# Patient Record
Sex: MALE | Race: WHITE | Employment: FULL TIME | URBAN - METROPOLITAN AREA
[De-identification: names, ages, dates, MRNs, and addresses within clinical notes are randomized per-mention and may not be internally consistent; named-entity substitution may affect disease eponyms.]

---

## 2018-04-30 ENCOUNTER — HOSPITAL ENCOUNTER (OUTPATIENT)
Dept: OCCUPATIONAL THERAPY | Age: 58
Discharge: HOME | End: 2018-04-30
Admitting: ORTHOPAEDIC SURGERY

## 2018-04-30 ENCOUNTER — OFFICE VISIT (OUTPATIENT)
Dept: ORTHOPEDIC SURGERY | Age: 58
End: 2018-04-30

## 2018-04-30 VITALS
SYSTOLIC BLOOD PRESSURE: 122 MMHG | BODY MASS INDEX: 21.19 KG/M2 | WEIGHT: 135 LBS | HEIGHT: 67 IN | HEART RATE: 66 BPM | DIASTOLIC BLOOD PRESSURE: 71 MMHG

## 2018-04-30 DIAGNOSIS — M79.645 FINGER PAIN, LEFT: Primary | ICD-10-CM

## 2018-04-30 DIAGNOSIS — S62.625B OPEN DISPLACED FRACTURE OF MIDDLE PHALANX OF LEFT RING FINGER, INITIAL ENCOUNTER: ICD-10-CM

## 2018-04-30 PROCEDURE — 99024 POSTOP FOLLOW-UP VISIT: CPT | Performed by: ORTHOPAEDIC SURGERY

## 2018-05-01 ENCOUNTER — HOSPITAL ENCOUNTER (OUTPATIENT)
Dept: OCCUPATIONAL THERAPY | Age: 58
Discharge: HOME | End: 2018-05-31
Attending: ORTHOPAEDIC SURGERY | Admitting: ORTHOPAEDIC SURGERY

## 2018-05-02 ENCOUNTER — TELEPHONE (OUTPATIENT)
Dept: ORTHOPEDIC SURGERY | Age: 58
End: 2018-05-02

## 2018-05-02 DIAGNOSIS — S62.625B DISPLACED FRACTURE OF MIDDLE PHALANX OF LEFT RING FINGER, INITIAL ENCOUNTER FOR OPEN FRACTURE: Primary | ICD-10-CM

## 2018-05-02 DIAGNOSIS — S62.625D DISPLACED FRACTURE OF MIDDLE PHALANX OF LEFT RING FINGER, SUBSEQUENT ENCOUNTER FOR FRACTURE WITH ROUTINE HEALING: Primary | ICD-10-CM

## 2018-05-02 RX ORDER — HYDROCODONE BITARTRATE AND ACETAMINOPHEN 5; 325 MG/1; MG/1
1 TABLET ORAL EVERY 8 HOURS PRN
Qty: 12 TABLET | Refills: 0 | Status: SHIPPED | OUTPATIENT
Start: 2018-05-02 | End: 2018-05-05

## 2018-05-02 RX ORDER — HYDROCODONE BITARTRATE AND ACETAMINOPHEN 5; 325 MG/1; MG/1
1 TABLET ORAL EVERY 8 HOURS PRN
Qty: 12 TABLET | Refills: 0 | Status: SHIPPED | OUTPATIENT
Start: 2018-05-02 | End: 2018-05-02 | Stop reason: CLARIF

## 2018-05-24 ENCOUNTER — OFFICE VISIT (OUTPATIENT)
Dept: ORTHOPEDIC SURGERY | Age: 58
End: 2018-05-24

## 2018-05-24 VITALS — BODY MASS INDEX: 21.18 KG/M2 | HEIGHT: 67 IN | WEIGHT: 134.92 LBS

## 2018-05-24 DIAGNOSIS — S62.625B OPEN DISPLACED FRACTURE OF MIDDLE PHALANX OF LEFT RING FINGER, INITIAL ENCOUNTER: Primary | ICD-10-CM

## 2018-05-24 PROCEDURE — 14040 TIS TRNFR F/C/C/M/N/A/G/H/F: CPT | Performed by: ORTHOPAEDIC SURGERY

## 2018-05-24 PROCEDURE — 99024 POSTOP FOLLOW-UP VISIT: CPT | Performed by: ORTHOPAEDIC SURGERY

## 2018-05-24 RX ORDER — CEPHALEXIN 500 MG/1
500 CAPSULE ORAL 4 TIMES DAILY
Qty: 28 CAPSULE | Refills: 0 | Status: SHIPPED | OUTPATIENT
Start: 2018-05-24 | End: 2018-10-10 | Stop reason: ALTCHOICE

## 2018-05-31 ENCOUNTER — OFFICE VISIT (OUTPATIENT)
Dept: ORTHOPEDIC SURGERY | Age: 58
End: 2018-05-31

## 2018-05-31 VITALS — HEIGHT: 67 IN | BODY MASS INDEX: 21.18 KG/M2 | WEIGHT: 134.92 LBS

## 2018-05-31 DIAGNOSIS — S62.625B OPEN DISPLACED FRACTURE OF MIDDLE PHALANX OF LEFT RING FINGER, INITIAL ENCOUNTER: Primary | ICD-10-CM

## 2018-05-31 PROCEDURE — 99024 POSTOP FOLLOW-UP VISIT: CPT | Performed by: ORTHOPAEDIC SURGERY

## 2018-06-06 ENCOUNTER — OFFICE VISIT (OUTPATIENT)
Dept: ORTHOPEDIC SURGERY | Age: 58
End: 2018-06-06

## 2018-06-06 DIAGNOSIS — S62.625B OPEN DISPLACED FRACTURE OF MIDDLE PHALANX OF LEFT RING FINGER, INITIAL ENCOUNTER: Primary | ICD-10-CM

## 2018-06-06 PROCEDURE — 99024 POSTOP FOLLOW-UP VISIT: CPT | Performed by: ORTHOPAEDIC SURGERY

## 2018-06-12 ENCOUNTER — HOSPITAL ENCOUNTER (OUTPATIENT)
Dept: OCCUPATIONAL THERAPY | Age: 58
Discharge: HOME | End: 2018-06-30
Admitting: ORTHOPAEDIC SURGERY

## 2018-06-12 NOTE — FLOWSHEET NOTE
modulating pain, promoting relaxation,  increasing ROM, reducing/eliminating soft tissue swelling/inflammation/restriction, improving soft tissue extensibility and allowing for proper ROM for normal function with self care, reaching, carrying, lifting, house/yardwork, driving/computer work  [] Comments:    ADL Training:  []  (45649) Provided self-care/home management training related to activities of daily living and compensatory training, and/or use of adaptive equipment   [] Comments:     Splinting:  [] Fabrication of:    [x] (81887) Checkout for orthotic/prosthetic use, established patient-Tip protector splint, previous splint was too big to allow him to work    [] (68147) Orthotic management and training (fitting and assessment)  [] Comments:    Charges:  Timed Code Treatment Minutes: 15   Total Treatment Minutes: 65     [x] EVAL (LOW) 57527   [] OT Re-eval (20098)  [] EVAL (MOD) 42607   [] EVAL (HIGH) 92713       [x] Dread (37418) x      [] HNVVE(63847)  [] NMR (97152) x      [] Estim (attended) (39919)   [] Manual ([id number]) x       [] US (83760)  [] TA (53546) x      [] Paraffin (36102)  [] ADL  ([phone]) x     [] Splint/L code:    [x] Estim (unattended) (83477)  [x] [id number]    Frequency/Duration:  1-2 days per week for 6 weeks (7/24/18)        GOALS:  Short Term Goals: To be achieved in: 2 weeks  1. Independent in HEP and progression per patient tolerance, in order to prevent re-injury. 2. Patient will have a decrease in pain to facilitate improvement in movement, function, and ADLs as indicated by Functional Deficits.     Long Term Goals to be achieved in 6  weeks, including patient directed goals to address identified performance deficits:  1) Pt to be independent in graded HEP progression with a good level of effort and compliance. 2) Pt to report a score of 20 % or less on the Quick DASH disability questionnaire for increased performance with carrying, moving, and handling objects.   3) Pt will

## 2018-06-12 NOTE — PLAN OF CARE
II     Lateral Pinch     3 Point Pinch     Tip Pinch     MMT:            Observations (including splints, bandages, incisions, scars):   Dorsal finger completely healed except for 1 cm    Sensation: [] No reported deficits  [] Intact to light touch    [] Wiseman Charu test completed, findings as noted:  [x] Other:tingling volar middle and distal finger    Palpation:        Functional Mobility/Transfers/Gait: [x] Independent - no significant gait deviations  [] Assistance needed   [] Assistive device used: Falls Risk Assessment (30 days):   [] Falls Risk assessed and no intervention required. [] Falls Risk assessed and Patient requires intervention due to being higher risk   TUG score (>12s at risk):     [] Falls education provided, including      Review Of Systems (ROS): [x]Performed Review of systems (Integumentary, CardioPulmonary, Neurological) by intake and observation. Intake form has been scanned into medical record. Patient has been instructed to contact their primary care physician regarding ROS issues if not already being addressed at this time. ASSESSMENT:   This patient presents with signs and symptoms consistent with the medical diagnosis provided by the referring physician.      Impairments (physical, cognitive and/or psychosocial):  [x] Decreased mobility   [x] Weakness    [] Hypersensitivity   [x] Pain/tenderness   [x] Edema/swelling   [] Decreased coordination (fine/gross motor)   [] Impaired body mechanics  [] Sensory loss  [] Loss of balance   [] Other:      Performance Deficits (to be addressed in plan of care):   [] Bathing    [] Household Tasks (cooking/cleaning)   [] Dressing    [] Self Feeding   [] Grooming    [] Work/Education   [] Functional Mobility   [] Sleeping/Rest   [] Toileting/Hygiene   [] Recreational Activities   [] Driving    [] Community/Social Participation   [] Other:     Rehab Potential:   [] Excellent [] Good [] Fair  [] Poor     Barriers affecting rehab expanded review of medical and/or therapy records and additional review     of physical, cognitive or psychosocial history related to current functional    performance   [] an extensive additional review of review of medical and/or therapy records   and physical, cognitive, or psychosocial history related to current    functional performance    [] An assessment that identifies performance deficits (relating to physical, cognitive, or psychosocial skills) that result in activity limitations and/or participation restrictions:   [x] 1-3 performance deficits   [] 3-5 performance deficits   [] 5 or more performance deficits    [] Clinical decision making of:   [x] low complexity, including analysis of occupational profile, data analysis from problem focused assessment, and consideration of a limited number of treatment options. No comorbidities affect occupational performance. No task modifications or assistance needed to complete evaluation. [] moderate complexity, including analysis of occupational profile, data analysis from detailed assessment and consideration of several treatment options. Comorbidities that affect occupational performance may be present. Minimal to moderate task modifications or assistance needed to complete assessment. [] high complexity, including analysis of occupational profile, analysis of data from comprehensive assessment and consideration of multiple treatment options. Multiple comorbidities present that affect occupational performance. Significant task modifications or assistance needed to complete assessment. Evaluation Code:  [x] Low Complexity EVAL 52343 (typically 30 minutes face to face)  [] Mod Complexity EVAL 72967 (typically 45 minutes face to face)  [] High Complexity EVAL 10658 (typically 60 minutes face to face)             Electronically signed by:   Danielle Castillo OTR/L [address] [place] [address] [id number]

## 2018-06-19 ENCOUNTER — HOSPITAL ENCOUNTER (OUTPATIENT)
Dept: OCCUPATIONAL THERAPY | Age: 58
Discharge: HOME | End: 2018-06-20
Admitting: ORTHOPAEDIC SURGERY

## 2018-06-27 ENCOUNTER — OFFICE VISIT (OUTPATIENT)
Dept: ORTHOPEDIC SURGERY | Age: 58
End: 2018-06-27

## 2018-06-27 DIAGNOSIS — S62.625B OPEN DISPLACED FRACTURE OF MIDDLE PHALANX OF LEFT RING FINGER, INITIAL ENCOUNTER: Primary | ICD-10-CM

## 2018-06-27 PROCEDURE — 99024 POSTOP FOLLOW-UP VISIT: CPT | Performed by: ORTHOPAEDIC SURGERY

## 2018-07-01 ENCOUNTER — HOSPITAL ENCOUNTER (OUTPATIENT)
Dept: PHYSICAL THERAPY | Age: 58
Discharge: HOME | End: 2018-07-31
Attending: ORTHOPAEDIC SURGERY | Admitting: ORTHOPAEDIC SURGERY

## 2018-08-01 ENCOUNTER — OFFICE VISIT (OUTPATIENT)
Dept: ORTHOPEDIC SURGERY | Age: 58
End: 2018-08-01

## 2018-08-01 VITALS — BODY MASS INDEX: 21.03 KG/M2 | HEIGHT: 67 IN | WEIGHT: 134 LBS

## 2018-08-01 DIAGNOSIS — S62.625B OPEN DISPLACED FRACTURE OF MIDDLE PHALANX OF LEFT RING FINGER, INITIAL ENCOUNTER: Primary | ICD-10-CM

## 2018-08-01 PROCEDURE — 99024 POSTOP FOLLOW-UP VISIT: CPT | Performed by: ORTHOPAEDIC SURGERY

## 2018-08-03 NOTE — PROGRESS NOTES
will continue to discuss options after most recent studies have been completed    No Follow-up on file. History of Present Illness  Mr. Tyrel Brunner a 57-year-old male presenting as a repeat scheduled postoperative visit after his left ring finger surgery  Procedure:1.  Irrigation and debridement of open fracture including skin,  subcutaneous tissue, muscle fascia, and bone of left ring finger middle  phalanx. 2.  Open reduction and internal fixation of left ring finger middle phalanx  fracture. 3.  Repair of finger extensor tendon left ring finger. 4.  Repair of complex laceration left ring finger 3.5 cm. Date of procedure: 4/24/2018  Is now approximately 13 weeks status post surgery. Since his last visit, he has not had many changes with regards to his left ring finger. He continues to have some intermittent pain and this does become uncomfortable while he was attempting to work. He does continue to note some instability in his fingertip and has been attempting some bracing but this is been difficult for him. No new swelling or drainage although he did notice a small suture from a pinhole area of wound that had been incompletely healed. He did remove some of the suture. No fever or chills or no other constitutional symptoms today    Review of Systems  Pertinent items are noted in HPI  Review of systems reviewed from Patient History Form dated on 4/24/18 and available in the patient's chart under the Media tab. Vital Signs  There were no vitals filed for this visit. Physical Exam  Constitutional:  Patient is well-nourished and demonstrates normal hygiene. Mental Status:  Patient is alert and oriented to person, place and time. Skin:  Intact, no rashes or lesions. Left ring finger/hand:   Near complete healed wound with pinpoint area at most distal apex of laceration nonhealed, no active drainage no surrounding erythema or fluctuance.  There is a deep suture protruding from this area which was clipped with no obvious exposed tendon or bone  Diffuse swelling and fibrinous scar at the dorsum of middle phalanx extending to near PIP joint , slightly diminished from previous examination with no fluctuance  With gentle stress of fracture there is laxity at fracture site with mild discomfort  Normal finger cascade and tenodesis with no active DIP joint motion  PIP joint range of motion approximately 0 to 90 ° without increased pain  No pain in palm, hand or remainder of finger  Gross sensation intact to fingertip with capillary refill brisk at fingertip    Capillary refill brisk all fingers, symmetric  Gross sensation intact to light touch median/ulnar/radial nerves  Sensation intact to radial/ulnar aspect of fingertip        Radiology:    X-rays obtained and reviewed in office:  Views 3  Location left ring finger  Impression unchanged position of middle phalangeal neck fracture. There is increased density near this area with fracture line still evident.  No change otherwise in position and no additional osseous and normality      Additional Diagnostic Test Findings:    Office Procedures:  Orders Placed This Encounter   Procedures    XR FINGER LEFT (MIN 2 VIEWS)     10082     Order Specific Question:   Reason for exam:     Answer:   Pain    MRI HAND LEFT W WO CONTRAST     Osteomyelitis of left ring finger  Once approved will schedule at [facility]     Standing Status:   Future     Standing Expiration Date:   8/1/2019     Order Specific Question:   Reason for exam:     Answer:   PAIN    C-reactive protein    VITAMIN D 1,25 DIHYDROXY     Standing Status:   Future     Standing Expiration Date:   8/2/2019    Testosterone     Standing Status:   Future     Standing Expiration Date:   8/2/2019    TSH without Reflex     Standing Status:   Future     Standing Expiration Date:   8/2/2019    T4, FREE     Standing Status:   Future     Standing Expiration Date:   8/2/2019    PREALBUMIN     Standing Status:

## 2018-08-07 ENCOUNTER — HOSPITAL ENCOUNTER (OUTPATIENT)
Dept: MRI IMAGING | Age: 58
Discharge: HOME | End: 2018-08-07
Attending: ORTHOPAEDIC SURGERY | Admitting: ORTHOPAEDIC SURGERY

## 2018-08-07 DIAGNOSIS — S62.625B OPEN DISPLACED FRACTURE OF MIDDLE PHALANX OF LEFT RING FINGER, INITIAL ENCOUNTER: ICD-10-CM

## 2018-08-07 DIAGNOSIS — S62.625B: ICD-10-CM

## 2018-08-08 ENCOUNTER — HOSPITAL ENCOUNTER (OUTPATIENT)
Dept: OTHER | Age: 58
Discharge: HOME | End: 2018-08-08
Attending: ORTHOPAEDIC SURGERY | Admitting: ORTHOPAEDIC SURGERY

## 2018-08-08 DIAGNOSIS — S62.625B OPEN DISPLACED FRACTURE OF MIDDLE PHALANX OF LEFT RING FINGER, INITIAL ENCOUNTER: ICD-10-CM

## 2018-08-08 LAB
CALCIUM SERPL-MCNC: 9.7 MG/DL (ref 8.3–10.6)
PREALBUMIN: 25.8 MG/DL (ref 20–40)
T4 FREE: 1 NG/DL (ref 0.9–1.8)
TSH SERPL DL<=0.05 MIU/L-ACNC: 5.09 UIU/ML (ref 0.27–4.2)

## 2018-08-09 LAB — C-REACTIVE PROTEIN: 0.8 MG/L (ref 0–5.1)

## 2018-08-10 LAB — TESTOSTERONE TOTAL: 667 NG/DL (ref 220–1000)

## 2018-08-11 LAB — VITAMIN D 1,25-DIHYDROXY: 45.5 PG/ML (ref 19.9–79.3)

## 2018-08-22 LAB
3-OH-COTININE: 117 NG/ML
COTININE: 226 NG/ML
NICOTINE: 10 NG/ML

## 2018-08-23 ENCOUNTER — OFFICE VISIT (OUTPATIENT)
Dept: ORTHOPEDIC SURGERY | Age: 58
End: 2018-08-23

## 2018-08-23 DIAGNOSIS — S62.625B OPEN DISPLACED FRACTURE OF MIDDLE PHALANX OF LEFT RING FINGER, INITIAL ENCOUNTER: Primary | ICD-10-CM

## 2018-08-23 PROCEDURE — 99213 OFFICE O/P EST LOW 20 MIN: CPT | Performed by: ORTHOPAEDIC SURGERY

## 2018-08-24 NOTE — PROGRESS NOTES
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 Although I think that infection could be a possibility, I am less suspicious based on his current clinical exam and labs in advanced imaging that this is the cause of his nonunion. Rather I think that this is a combination of lack of mechanical stability as well as his history of smoking which have contributed to his current nonunion and subsequent instability and symptoms. He does have a severe injury and right now he is most bothered by both the stiffness in his PIP joint but also his left ring finger getting in the way of gripping and performing his daily activities. We discussed options today for treatment including the possibility of continued therapy for PIP joint range of motion understanding that stiffness will likely be a part of his recovery regardless of treatment. Surgical options were also discussed including the possibility of arthrodesis at the IP joint and bone grafting. Finally, we did discuss the possibility of amputation near the level of his fracture as an option to allow for earlier healing and possibly improve his function. The patient desires amputation at this point, understanding his other surgical and nonsurgical options that we discussed. We had a long discussion and for further evaluation I would like to refer him to my hand surgery colleagues Dr. Kim Colin for an opinion, any other suggestions that we may consider in this case and if amputation is a viable option for the patient. The patient is in agreement with this today and we'll continue with his activity as tolerated    He will currently be on one-handed duty secondary to his failure to demonstrate appropriate healing, continued pain and instability in his finger.  We will continue laxity at fracture site with mild discomfort  Normal finger cascade and tenodesis with no active DIP joint motion  PIP joint range of motion approximately 0 to 80° with no increased pain  There is slight malrotation of the left ring finger throughout finger cascade  No pain in palm, hand or remainder of finger  Gross sensation intact to fingertip with capillary refill brisk at fingertip  Capillary refill brisk all fingers, symmetric  Gross sensation intact to light touch median/ulnar/radial nerves  Sensation intact to radial/ulnar aspect of fingertip        Radiology:    X-rays obtained and reviewed in office:  No additional images obtained today     Additional Diagnostic Test Findings:  EXAMINATION:   MRI OF THE LEFT HAND WITHOUT AND WITH CONTRAST, 8/7/2018 7:49 am       TECHNIQUE:   Multiplanar multisequence MRI of the left hand was performed without and with   the administration of intravenous contrast.       COMPARISON:   08/01/2018, 01/06/2018 plain radiographs of the left ring finger       HISTORY:   ORDERING PHYSICIAN PROVIDED HISTORY: Open displaced fracture of middle   phalanx of left ring finger, initial encounter   TECHNOLOGIST PROVIDED HISTORY:   Osteomyelitis of left ring finger   Once approved will schedule at [place] Provided Reason for Exam: ATTENTION TO LEFT 4TH PHALANGE OPEN   DISPLACED FX THAT IS NOT HEALING   Acuity: Acute   Type of Encounter: Initial   Additional signs and symptoms: ATTENTION TO LEFT 4TH PHALANGE OPEN DISPLACED   FX THAT IS NOT HEALING   Relevant Medical/Surgical History: ATTENTION TO LEFT 4TH PHALANGE OPEN   DISPLACED FX THAT IS NOT HEALING       Patient is a 61-year-old male with nonhealing open fracture of the middle   phalanx of the left ring finger; evaluate for osteomyelitis.       FINDINGS:   SOFT TISSUES: Minimal edema within the visualized subcutaneous fat of the   left hand without clear evidence for abscess formation or definite   cellulitis.  Visualized musculature grossly unremarkable.  Visualized flexor   and extensor tendons appear grossly intact without evidence of tearing or   tenosynovitis.  Visualized collateral ligaments appear intact.       Soft tissue marker is seen dorsal to the proximal 4th and 5th metacarpals   without any subjacent fluid collection, soft tissue mass or signal   abnormality.       BONE MARROW: There are vertically oriented linear areas of low T1 signal and   high STIR signal within the proximal and distal phalanges of the 4th   digit/ring finger likely representing the tracts related to prior hardware   placement/orthopedic pinning.  No obvious osseous destruction is evident.  No   sizable DIP joint effusion is seen at the DIP joint of the 4th digit.  These   findings are well visualized on coronal STIR image number 17, series 5 and   coronal T1 weighted image number 17, series 4 as well as sagittal STIR images   11-14, series 6.  No avid postcontrast enhancement is identified within this   location of postcontrast imaging.       No marginal erosions.  Bone marrow signal intensity within the visualized   osseous structures is otherwise within normal limits.       Subcortical cystic change is seen within the capitate on image 19, series 4   and series 5.       JOINTS: Mild degenerative changes of the visualized intercarpal joint spaces. Mild degenerative changes at the 1st MCP joint and 1st carpometacarpal joint   as well as the scaphoid-trapezium articulation.           Impression   1. Findings involving the middle and distal phalanges of the 4th digit/ring   finger are favored to be changes related to prior surgical pinning of the   middle and distal phalanges of the 4th digit.  While subtle infection   difficult to exclude due to small field of view, no overt or clear MR   evidence for osteomyelitis or aggressive osseous destruction at this point. 2. Mild underlying osteoarthrosis.    3. No abscess formation or obvious

## 2018-09-04 ENCOUNTER — TELEPHONE (OUTPATIENT)
Dept: ORTHOPEDIC SURGERY | Age: 58
End: 2018-09-04

## 2018-09-04 NOTE — TELEPHONE ENCOUNTER
SPOKE WTIH PATIENT . Sunita Guillen STATES HIS WORKERS COMP CASE IS BEING CLOSED SINCE HE'S \"FULL DUTY\" . 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 TOLD PATIENT I WILL DISCUSS THIS WITH DR. Maki Qiu  TO CONFIRM HIS RESTRICTIONS. ALSO EXPLAINED TO HIM HE WAS APPROVED TO HAVE THE 2ND OPINION DONE W/ DR. KATZ

## 2018-09-10 ENCOUNTER — OFFICE VISIT (OUTPATIENT)
Dept: ORTHOPEDIC SURGERY | Age: 58
End: 2018-09-10

## 2018-09-10 DIAGNOSIS — S66.325A LACERATION OF EXTENSOR MUSCLE, FASCIA AND TENDON OF LEFT RING FINGER AT WRIST AND HAND LEVEL, INITIAL ENCOUNTER: ICD-10-CM

## 2018-09-10 DIAGNOSIS — S62.625S OPEN DISPLACED FRACTURE OF MIDDLE PHALANX OF LEFT RING FINGER, SEQUELA: ICD-10-CM

## 2018-09-10 DIAGNOSIS — S67.195S: ICD-10-CM

## 2018-09-10 DIAGNOSIS — S61.215S LACERATION OF LEFT RING FINGER WITHOUT FOREIGN BODY WITHOUT DAMAGE TO NAIL, SEQUELA: Primary | ICD-10-CM

## 2018-09-10 PROCEDURE — 99243 OFF/OP CNSLTJ NEW/EST LOW 30: CPT | Performed by: ORTHOPAEDIC SURGERY

## 2018-09-10 NOTE — PROGRESS NOTES
Systems  Pertinent items are noted in HPI  Denies fever chills, confusion and bowel and bladder active change  Complete Review of Systems reviewed from patient history form dated 9/10/2018 and available in the patients chart under the media tab. Vital Signs  There were no vitals filed for this visit. There is no height or weight on file to calculate BMI. Medical History  History reviewed. No pertinent past medical history. Current Outpatient Prescriptions on File Prior to Visit   Medication Sig Dispense Refill    cephALEXin (KEFLEX) 500 MG capsule Take 1 capsule by mouth 4 times daily 28 capsule 0    docusate sodium (COLACE) 100 MG capsule Take 1 capsule by mouth 2 times daily as needed for Constipation 40 capsule 0    cephALEXin (KEFLEX) 500 MG capsule Take 1 capsule by mouth 4 times daily 20 capsule 0     No current facility-administered medications on file prior to visit. No Known Allergies  [unfilled]  History reviewed. No pertinent family history. Physical Exam  Constitutional:  Patient is well-nourished and demonstrates normal hygiene. Mental Status:  Patient is alert and oriented to person, place and time. Skin:  Intact, no rashes or lesions. Hand Examination: There is slight ulnar deviation deformity through the fracture site and there still clinical motion in the fracture site he has no flexion or extension beyond 0° ankylosis of the distal interphalangeal joint PIP range of motion is full extension to about 75 or 80° flexion    Additional Comments:     Additional Examinations:  X-Ray Findings:  I reviewed his series of radiographs and it does appear that he has a nonunion of the middle phalanx. There was good position of the fracture after the pinning.   Additional Diagnostic Test Findings: MRI study of the digit does not show avascular necrosis of the condyles of the middle phalanx    Office Procedures:    I spent 15 minutes, face to face, with the patient discussing treatment

## 2018-09-14 ENCOUNTER — TELEPHONE (OUTPATIENT)
Dept: ORTHOPEDIC SURGERY | Age: 58
End: 2018-09-14

## 2018-09-26 ENCOUNTER — OFFICE VISIT (OUTPATIENT)
Dept: ORTHOPEDIC SURGERY | Age: 58
End: 2018-09-26
Payer: COMMERCIAL

## 2018-09-26 DIAGNOSIS — S62.625B OPEN DISPLACED FRACTURE OF MIDDLE PHALANX OF LEFT RING FINGER, INITIAL ENCOUNTER: Primary | ICD-10-CM

## 2018-09-26 PROCEDURE — 99213 OFFICE O/P EST LOW 20 MIN: CPT | Performed by: ORTHOPAEDIC SURGERY

## 2018-09-26 NOTE — PROGRESS NOTES
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 We discussed options such as continued observation and working on range of motion. We also discussed possibility of repeat surgical intervention, attempted arthrodesis and repositioning of his DIP joint in fracture. Ultimately, the patient understands his options but feels that the best option for him would be amputation in order to allow for more straightforward healing, less variation in position of the finger and further stiffness  He understands the risks involved including infection, bleeding, damaged nerves and blood vessels, stiffness, continued pain in the possibility of neuroma formation  We also discussed risks of anesthesia including stroke heart attack blood clot and death. I ultimately think that this is a difficult situation and the patient does understand that amputation is not an ideal option but I do think that this can be considered in this case for the reasons the patient desires. We will plan for amputation near the level of middle phalanx to allow for preserved PIP joint function. Plan for surgery scheduling within the next several weeks, patient is understanding and in agreement with the plan today      No Follow-up on file. History of Present Illness  Patrick Grullon is a 56 y.o. male here today for a follow-up for His left ring finger. He had a severe crush injury to left ring finger with middle phalanx fracture with subsequent stiffness and delayed union/nonunion. At his last visit we discussed options and the patient did desire the option of amputation because of pain, stiffness and difficulty with functioning at work with his current finger.   I did have him follow-up with one of my colleagues for further discussed options for him and to discuss the possibility of amputation as well  He is here today to discuss and further plan for decision making. He has been working on range of motion at the PIP joint and overall minimal change since his last visit. He would like to strongly consider surgical intervention and particularly amputation at this point    Review of Systems  Pertinent items are noted in HPI  Review of systems reviewed from Patient History Form dated on 4/30/18 and available in the patient's chart under the Media tab. Vital Signs  There were no vitals filed for this visit. Physical Exam  Constitutional:  Patient is well-nourished and demonstrates normal hygiene. Mental Status:  Patient is alert and oriented to person, place and time. Skin:  Intact, no rashes or lesions. Left ring finger/hand:   Healed dorsal wound without any evidence of erythema or fluctuance and no drainage     Diffuse swelling and fibrinous scar at the dorsum of middle phalanx extending to near PIP joint , no obvious fluctuance  With gentle stress of fracture there is continued instability and mild discomfort fracture site  PIP joint range of motion approximately 0 to 80° with no increased pain  There is slight malrotation of the left ring finger throughout finger cascade  No pain in palm, hand or remainder of finger  Gross sensation intact to fingertip with capillary refill brisk at fingertip  Capillary refill brisk all fingers, symmetric  Gross sensation intact to light touch median/ulnar/radial nerves  Sensation intact to radial/ulnar aspect of fingertip          Radiology:    X-rays obtained and reviewed in office:  No new images obtained today    Additional Diagnostic Test Findings:    Office Procedures:  No orders of the defined types were placed in this encounter.           Linette Ramsey MD  Orthopaedic Surgeon, [address] [place] [address] Orthopaedic & Sports Medicine    Contact Information:  Xavier Kinney: [phone]

## 2018-10-03 NOTE — PROGRESS NOTES
[facility] / [facility] ([facility]) [address] [facility], [address]    Acknowledgment of Informed Consent for Surgical or Medical Procedure and Sedation  I agree to allow doctor(s) [facility] and his/her associates or assistants, including residents and/or other qualified medical practitioner to perform the following medical treatment or procedure and to administer or direct the administration of sedation as necessary:  Procedure(s): LEFT RING FINGER REVISION AMPUTATION  [address]  My doctor has explained the following regarding the proposed procedure:   the explanation of the procedure   the benefits of the procedure   the potential problems that might occur during recuperation   the risks and side effects of the procedure which could include but are not limited to severe blood loss, infection, stroke or death   the benefits, risks and side effect of alternative procedures including the consequences of declining this procedure or any alternative procedures   the likelihood of achieving satisfactory results. I acknowledge no guarantee or assurance has been made to me regarding the results. I understand that during the course of this treatment/procedure, unforeseen conditions can occur which require an additional or different procedure. I agree to allow my physician or assistants to perform such extension of the original procedure as they may find necessary. I understand that sedation will often result in temporary impairment of memory and fine motor skills and that sedation can occasionally progress to a state of deep sedation or general anesthesia. I understand the risks of anesthesia for surgery include, but are not limited to, sore throat, hoarseness, injury to face, mouth, or teeth; nausea; headache; injury to blood vessels or nerves; death, brain damage, or paralysis.     I understand that if I have a Limitation of Treatment order in effect during my

## 2018-10-10 NOTE — PROGRESS NOTES
items. Please have any large items you may need brought in by your family after your arrival to your hospital room. 15. If you have a Living Will or Durable Power of , please bring a copy on the day of your procedure. 15. With your permission, one family member may accompany you while you are being prepared for surgery. Once you are ready, additional family members may join you. HOW WE KEEP YOU SAFE and WORK TO PREVENT SURGICAL SITE INFECTIONS:  1. Health care workers should always check your ID bracelet to verify your name and birth date. You will be asked many times to state your name, date of birth, and allergies. 2. Health care workers should always clean their hands with soap or alcohol gel before providing care to you. It is okay to ask anyone if they cleaned their hands before they touch you. 3. You will be actively involved in verifying the type of procedure you are having and ensuring the correct surgical site. This will be confirmed multiple times prior to your procedure. Do NOT demetrius your surgery site UNLESS instructed to by your surgeon. 4. Do not shave or wax for 72 hours prior to procedure near your operative site. Shaving with a razor can irritate your skin and make it easier to develop an infection. On the day of your procedure, any hair that needs to be removed near the surgical site will be clipped by a healthcare worker using a special clippers designed to avoid skin irritation. 5. When you are in the operating room, your surgical site will be cleansed with a special soap, and in most cases, you will be given an antibiotic before the surgery begins. What to expect AFTER YOUR PROCEDURE:  1. Immediately following your procedure, your will be taken to the PACU for the first phase of your recovery. Your nurse will help you recover from any potential side effects of anesthesia, such as extreme drowsiness, changes in your vital signs or breathing patterns.  Nausea, headache, muscle aches, or sore throat may also occur after anesthesia. Your nurse will help you manage these potential side effects. 2. For comfort and safety, arrange to have someone at home with you for the first 24 hours after discharge. 3. You and your family will be given written instructions about your diet, activity, dressing care, medications, and return visits. 4. Once at home, should issues with nausea, pain, or bleeding occur, or should you notice any signs of infection, you should call your surgeon. 5. Always clean your hands before and after caring for your wound. Do not let your family touch your surgery site without cleaning their hands. 6. Narcotic pain medications can cause significant constipation. You may want to add a stool softener to your postoperative medication schedule or speak to your surgeon on how best to manage this SIDE EFFECT. SPECIAL INSTRUCTIONS     Thank you for allowing us to care for you. We strive to exceed your expectations in the delivery of care and service provided to you and your family. If you need to contact us for any reason, please call us at [phone]    Instructions reviewed with patient during preadmission testing phone interview. Nancy Tate. 10/10/2018 .12:43 PM      ADDITIONAL EDUCATIONAL INFORMATION REVIEWED PER PHONE WITH YOU AND/OR YOUR FAMILY:  No Bring a urine sample on day of surgery  Yes Pain Goal-Taking Control of Your Pain  Yes FAQs about Surgical Site Infections  No Hibiclens® Bathing Instructions   Yes Antibacterial Soap  No Pb® Wipes Bathing Instructions (Obtained from: https://www.[employer]/. pdf )  No Incentive Spirometer Education  No Other

## 2018-10-11 ENCOUNTER — ANESTHESIA EVENT (OUTPATIENT)
Dept: OPERATING ROOM | Age: 58
End: 2018-10-11
Payer: COMMERCIAL

## 2018-10-12 ENCOUNTER — ANESTHESIA (OUTPATIENT)
Dept: OPERATING ROOM | Age: 58
End: 2018-10-12
Payer: COMMERCIAL

## 2018-10-12 ENCOUNTER — HOSPITAL ENCOUNTER (OUTPATIENT)
Age: 58
Setting detail: OUTPATIENT SURGERY
Discharge: HOME | End: 2018-10-12
Attending: ORTHOPAEDIC SURGERY | Admitting: ORTHOPAEDIC SURGERY
Payer: COMMERCIAL

## 2018-10-12 VITALS
BODY MASS INDEX: 22.76 KG/M2 | RESPIRATION RATE: 18 BRPM | HEIGHT: 67 IN | DIASTOLIC BLOOD PRESSURE: 88 MMHG | WEIGHT: 145 LBS | SYSTOLIC BLOOD PRESSURE: 140 MMHG | OXYGEN SATURATION: 98 % | HEART RATE: 59 BPM | TEMPERATURE: 97.5 F

## 2018-10-12 VITALS
DIASTOLIC BLOOD PRESSURE: 57 MMHG | OXYGEN SATURATION: 98 % | RESPIRATION RATE: 13 BRPM | SYSTOLIC BLOOD PRESSURE: 99 MMHG | TEMPERATURE: 95.5 F

## 2018-10-12 DIAGNOSIS — S62.625B OPEN DISPLACED FRACTURE OF MIDDLE PHALANX OF LEFT RING FINGER, INITIAL ENCOUNTER: Primary | ICD-10-CM

## 2018-10-12 PROCEDURE — 87205 SMEAR GRAM STAIN: CPT

## 2018-10-12 PROCEDURE — 2709999900 HC NON-CHARGEABLE SUPPLY: Performed by: ORTHOPAEDIC SURGERY

## 2018-10-12 PROCEDURE — 2500000003 HC RX 250 WO HCPCS: Performed by: ORTHOPAEDIC SURGERY

## 2018-10-12 PROCEDURE — 87102 FUNGUS ISOLATION CULTURE: CPT

## 2018-10-12 PROCEDURE — 88311 DECALCIFY TISSUE: CPT

## 2018-10-12 PROCEDURE — 3700000000 HC ANESTHESIA ATTENDED CARE: Performed by: ORTHOPAEDIC SURGERY

## 2018-10-12 PROCEDURE — 2500000003 HC RX 250 WO HCPCS: Performed by: NURSE ANESTHETIST, CERTIFIED REGISTERED

## 2018-10-12 PROCEDURE — 6370000000 HC RX 637 (ALT 250 FOR IP): Performed by: ORTHOPAEDIC SURGERY

## 2018-10-12 PROCEDURE — 3600000014 HC SURGERY LEVEL 4 ADDTL 15MIN: Performed by: ORTHOPAEDIC SURGERY

## 2018-10-12 PROCEDURE — 2580000003 HC RX 258: Performed by: ANESTHESIOLOGY

## 2018-10-12 PROCEDURE — 87116 MYCOBACTERIA CULTURE: CPT

## 2018-10-12 PROCEDURE — 6360000002 HC RX W HCPCS: Performed by: ANESTHESIOLOGY

## 2018-10-12 PROCEDURE — 7100000011 HC PHASE II RECOVERY - ADDTL 15 MIN: Performed by: ORTHOPAEDIC SURGERY

## 2018-10-12 PROCEDURE — 7100000010 HC PHASE II RECOVERY - FIRST 15 MIN: Performed by: ORTHOPAEDIC SURGERY

## 2018-10-12 PROCEDURE — 87070 CULTURE OTHR SPECIMN AEROBIC: CPT

## 2018-10-12 PROCEDURE — 87015 SPECIMEN INFECT AGNT CONCNTJ: CPT

## 2018-10-12 PROCEDURE — 7100000000 HC PACU RECOVERY - FIRST 15 MIN: Performed by: ORTHOPAEDIC SURGERY

## 2018-10-12 PROCEDURE — 88305 TISSUE EXAM BY PATHOLOGIST: CPT

## 2018-10-12 PROCEDURE — 2580000003 HC RX 258: Performed by: ORTHOPAEDIC SURGERY

## 2018-10-12 PROCEDURE — 6360000002 HC RX W HCPCS: Performed by: NURSE ANESTHETIST, CERTIFIED REGISTERED

## 2018-10-12 PROCEDURE — 3700000001 HC ADD 15 MINUTES (ANESTHESIA): Performed by: ORTHOPAEDIC SURGERY

## 2018-10-12 PROCEDURE — 3600000004 HC SURGERY LEVEL 4 BASE: Performed by: ORTHOPAEDIC SURGERY

## 2018-10-12 PROCEDURE — 87206 SMEAR FLUORESCENT/ACID STAI: CPT

## 2018-10-12 PROCEDURE — 7100000001 HC PACU RECOVERY - ADDTL 15 MIN: Performed by: ORTHOPAEDIC SURGERY

## 2018-10-12 RX ORDER — MAGNESIUM HYDROXIDE 1200 MG/15ML
LIQUID ORAL CONTINUOUS PRN
Status: DISCONTINUED | OUTPATIENT
Start: 2018-10-12 | End: 2018-10-12 | Stop reason: HOSPADM

## 2018-10-12 RX ORDER — MIDAZOLAM HYDROCHLORIDE 1 MG/ML
INJECTION INTRAMUSCULAR; INTRAVENOUS PRN
Status: DISCONTINUED | OUTPATIENT
Start: 2018-10-12 | End: 2018-10-12 | Stop reason: SDUPTHER

## 2018-10-12 RX ORDER — SODIUM CHLORIDE, SODIUM LACTATE, POTASSIUM CHLORIDE, CALCIUM CHLORIDE 600; 310; 30; 20 MG/100ML; MG/100ML; MG/100ML; MG/100ML
INJECTION, SOLUTION INTRAVENOUS CONTINUOUS
Status: DISCONTINUED | OUTPATIENT
Start: 2018-10-12 | End: 2018-10-12 | Stop reason: HOSPADM

## 2018-10-12 RX ORDER — LIDOCAINE HYDROCHLORIDE 20 MG/ML
INJECTION, SOLUTION EPIDURAL; INFILTRATION; INTRACAUDAL; PERINEURAL PRN
Status: DISCONTINUED | OUTPATIENT
Start: 2018-10-12 | End: 2018-10-12 | Stop reason: SDUPTHER

## 2018-10-12 RX ORDER — PROMETHAZINE HYDROCHLORIDE 25 MG/ML
6.25 INJECTION, SOLUTION INTRAMUSCULAR; INTRAVENOUS
Status: DISCONTINUED | OUTPATIENT
Start: 2018-10-12 | End: 2018-10-12 | Stop reason: HOSPADM

## 2018-10-12 RX ORDER — FENTANYL CITRATE 50 UG/ML
25 INJECTION, SOLUTION INTRAMUSCULAR; INTRAVENOUS EVERY 5 MIN PRN
Status: DISCONTINUED | OUTPATIENT
Start: 2018-10-12 | End: 2018-10-12 | Stop reason: HOSPADM

## 2018-10-12 RX ORDER — PROPOFOL 10 MG/ML
INJECTION, EMULSION INTRAVENOUS PRN
Status: DISCONTINUED | OUTPATIENT
Start: 2018-10-12 | End: 2018-10-12 | Stop reason: SDUPTHER

## 2018-10-12 RX ORDER — FENTANYL CITRATE 50 UG/ML
50 INJECTION, SOLUTION INTRAMUSCULAR; INTRAVENOUS EVERY 5 MIN PRN
Status: DISCONTINUED | OUTPATIENT
Start: 2018-10-12 | End: 2018-10-12 | Stop reason: HOSPADM

## 2018-10-12 RX ORDER — ONDANSETRON 2 MG/ML
4 INJECTION INTRAMUSCULAR; INTRAVENOUS
Status: COMPLETED | OUTPATIENT
Start: 2018-10-12 | End: 2018-10-12

## 2018-10-12 RX ORDER — HYDROCODONE BITARTRATE AND ACETAMINOPHEN 5; 325 MG/1; MG/1
1 TABLET ORAL EVERY 6 HOURS PRN
Qty: 18 TABLET | Refills: 0 | Status: SHIPPED | OUTPATIENT
Start: 2018-10-12 | End: 2018-10-15

## 2018-10-12 RX ORDER — DEXAMETHASONE SODIUM PHOSPHATE 4 MG/ML
INJECTION, SOLUTION INTRA-ARTICULAR; INTRALESIONAL; INTRAMUSCULAR; INTRAVENOUS; SOFT TISSUE PRN
Status: DISCONTINUED | OUTPATIENT
Start: 2018-10-12 | End: 2018-10-12 | Stop reason: SDUPTHER

## 2018-10-12 RX ORDER — DOCUSATE SODIUM 100 MG/1
100 CAPSULE, LIQUID FILLED ORAL 2 TIMES DAILY PRN
Qty: 40 CAPSULE | Refills: 0 | Status: SHIPPED | OUTPATIENT
Start: 2018-10-12 | End: 2019-01-09

## 2018-10-12 RX ORDER — GINSENG 100 MG
CAPSULE ORAL PRN
Status: DISCONTINUED | OUTPATIENT
Start: 2018-10-12 | End: 2018-10-12 | Stop reason: HOSPADM

## 2018-10-12 RX ORDER — FENTANYL CITRATE 50 UG/ML
INJECTION, SOLUTION INTRAMUSCULAR; INTRAVENOUS PRN
Status: DISCONTINUED | OUTPATIENT
Start: 2018-10-12 | End: 2018-10-12 | Stop reason: SDUPTHER

## 2018-10-12 RX ADMIN — PROPOFOL 250 MG: 10 INJECTION, EMULSION INTRAVENOUS at 07:36

## 2018-10-12 RX ADMIN — LIDOCAINE HYDROCHLORIDE 60 MG: 20 INJECTION, SOLUTION EPIDURAL; INFILTRATION; INTRACAUDAL; PERINEURAL at 07:36

## 2018-10-12 RX ADMIN — MIDAZOLAM HYDROCHLORIDE 2 MG: 1 INJECTION INTRAMUSCULAR; INTRAVENOUS at 07:30

## 2018-10-12 RX ADMIN — DEXAMETHASONE SODIUM PHOSPHATE 4 MG: 4 INJECTION, SOLUTION INTRAMUSCULAR; INTRAVENOUS at 08:20

## 2018-10-12 RX ADMIN — ONDANSETRON 4 MG: 2 INJECTION INTRAMUSCULAR; INTRAVENOUS at 08:20

## 2018-10-12 RX ADMIN — SODIUM CHLORIDE, SODIUM LACTATE, POTASSIUM CHLORIDE, AND CALCIUM CHLORIDE: 600; 310; 30; 20 INJECTION, SOLUTION INTRAVENOUS at 05:55

## 2018-10-12 RX ADMIN — SODIUM CHLORIDE, SODIUM LACTATE, POTASSIUM CHLORIDE, AND CALCIUM CHLORIDE: 600; 310; 30; 20 INJECTION, SOLUTION INTRAVENOUS at 08:20

## 2018-10-12 RX ADMIN — FENTANYL CITRATE 25 MCG: 50 INJECTION INTRAMUSCULAR; INTRAVENOUS at 07:57

## 2018-10-12 ASSESSMENT — PULMONARY FUNCTION TESTS
PIF_VALUE: 10
PIF_VALUE: 9
PIF_VALUE: 10
PIF_VALUE: 1
PIF_VALUE: 10
PIF_VALUE: 9
PIF_VALUE: 5
PIF_VALUE: 10
PIF_VALUE: 10
PIF_VALUE: 9
PIF_VALUE: 10
PIF_VALUE: 9
PIF_VALUE: 10
PIF_VALUE: 10
PIF_VALUE: 9
PIF_VALUE: 10
PIF_VALUE: 8
PIF_VALUE: 10
PIF_VALUE: 1
PIF_VALUE: 9
PIF_VALUE: 10
PIF_VALUE: 2
PIF_VALUE: 1
PIF_VALUE: 1
PIF_VALUE: 0
PIF_VALUE: 10
PIF_VALUE: 9
PIF_VALUE: 10
PIF_VALUE: 9
PIF_VALUE: 10
PIF_VALUE: 1
PIF_VALUE: 9
PIF_VALUE: 10
PIF_VALUE: 10
PIF_VALUE: 1

## 2018-10-12 ASSESSMENT — PAIN - FUNCTIONAL ASSESSMENT: PAIN_FUNCTIONAL_ASSESSMENT: 0-10

## 2018-10-12 ASSESSMENT — PAIN SCALES - GENERAL
PAINLEVEL_OUTOF10: 0

## 2018-10-12 ASSESSMENT — LIFESTYLE VARIABLES: SMOKING_STATUS: 1

## 2018-10-12 NOTE — ANESTHESIA PRE PROCEDURE
Department of Anesthesiology  Preprocedure Note       Name:  Fely Salguero   Age:  56 y.o.  :  1960                                          MRN:  [id number]         Date:  10/12/2018      Surgeon: James Smart):  Maurisio Terrell MD    Procedure: Procedure(s):  LEFT RING FINGER REVISION AMPUTATION AT LEVEL OF MIDDLE PHALANX    Medications prior to admission:   Prior to Admission medications    Not on File       Current medications:    Current Facility-Administered Medications   Medication Dose Route Frequency Provider Last Rate Last Dose    ceFAZolin (ANCEF) 2 g in dextrose 5 % 50 mL IVPB  2 g Intravenous Once Maurisio Terrell MD        lactated ringers infusion   Intravenous Continuous Ashlee Wells  mL/hr at 10/12/18 0555         Allergies:  No Known Allergies    Problem List:    Patient Active Problem List   Diagnosis Code    Open displaced fracture of middle phalanx of left ring finger S62.625B       Past Medical History:        Diagnosis Date    Asthma     childhood       Past Surgical History:        Procedure Laterality Date    OTHER SURGICAL HISTORY  2018    ORIF finger Left hand       Social History:    Social History   Substance Use Topics    Smoking status: Current Every Day Smoker     Packs/day: 1.00     Years: 30.00    Smokeless tobacco: Never Used    Alcohol use No                                Ready to quit: Not Answered  Counseling given: Not Answered      Vital Signs (Current):   Vitals:    10/10/18 1240 10/12/18 0542   BP:  (!) 136/91   Pulse:  74   Resp:  17   Temp:  97.4 °F (36.3 °C)   TempSrc:  Oral   SpO2:  97%   Weight: 145 lb (65.8 kg) 145 lb (65.8 kg)   Height: 5' 7\" (1.702 m) 5' 7\" (1.702 m)                                              BP Readings from Last 3 Encounters:   10/12/18 (!) 136/91   18 122/71   18 130/77       NPO Status: Time of last liquid consumption: 2200                        Time of last solid consumption: 2200

## 2018-10-12 NOTE — BRIEF OP NOTE
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    Post-Op Diagnosis: Same       Procedure(s):  LEFT RING FINGER REVISION AMPUTATION AT LEVEL OF MIDDLE PHALANX with neurectomies and primary closure    Anesthesia: General/ Local    Surgeon(s):  Madhavi Braxton MD    Staff:  Surgical Assistant: No Wolff RN  Scrub Person First: Memo Lenz RN     Estimated Blood Loss: less than 5ml    Complications: None immediate apparent    Specimens:   ID Type Source Tests Collected by Time Destination   1 : BONE MIDDLE PHALANX LEFT RING FINGER Bone Bone FUNGUS CULTURE, SURGICAL CULTURE, ACID FAST CULTURE Madhavi Braxton MD 10/12/2018 0803    A : LEFT RING FINGER TIP Tissue Tissue SURGICAL PATHOLOGY Madhavi Braxton MD 10/12/2018 2186        Implants:  none      Drains:  none    Findings: see fully dictated operative report    Mary Lou Minor MD  Date: 10/12/2018  Time: 9:12 AM

## 2018-10-13 LAB
ANAEROBIC CULTURE: NORMAL
CULTURE SURGICAL: NORMAL

## 2018-10-13 NOTE — OP NOTE
[facility]               [address]                               OPERATIVE REPORT    PATIENT NAME: Yanely Pepe                   :         1960  MED REC NO:   [id number]                          ROOM:  ACCOUNT NO:   [de-identified]                           ADMIT DATE:  10/12/2018  PROVIDER:     Elsi Green MD    DATE OF PROCEDURE:  10/12/2018    LOCATION:  Formerly named [facility]. PREOPERATIVE DIAGNOSIS:  Nonunion, left ring finger middle phalanx  fracture with persistent stiffness and pain. POSTOPERATIVE DIAGNOSIS:  Nonunion, left ring finger middle phalanx  fracture with persistent stiffness and pain. OPERATION AND PROCEDURES PERFORMED:  Left ring finger revision  amputation at the level of the middle phalanx with neurectomies and  primary closure. PRIMARY SURGEON:  Elsi Green MD.    ASSISTANT:  [facility] surgical assistant. ANESTHESIA:  General plus local 1% lidocaine, 0.25% Marcaine, digital  block. ESTIMATED BLOOD LOSS:  Less than 5 mL. SPECIMENS:  Bone from middle phalanx, left ring finger, sent for  culture and gram stain. DRAINS:  None. IMPLANTS:  None. COMPLICATIONS:  None immediate apparent. BRIEF HISTORY:  The patient is a 82-year-old male with a severe  complex open fracture, sustained to his left ring finger, middle  phalanx. Several months prior, he had operative fixation of tendon  bone ligament performed by me. 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 We had a long discussion preoperatively about the operative treatment,  options at this point, and after a thorough discussion, the patient  elected to undergo revision amputation to attempt to provide more  function and quicker healing for his finger.     Risks, benefits, and alternatives of the surgery were amputation. The middle phalanx was slightly  contoured using a rongeur. Please note that the flexor digitorum  superficialis insertion was maintained during our amputation. A rasp  was used to smooth the edges of the residual middle phalanx. Two  views of the left ring finger demonstrated the level of amputation and  also appropriate contour. At this point, we had performed our neurectomies. Hemostasis had been  achieved as the tourniquet was deflated. Culture was sent from the  bone specimen from middle phalanx as well. We copiously irrigated the  wound. There was some scarring of the extensor mechanism and this was  freed up on the dorsal aspect. Central slip was maintained as well  and the remainder of the extensor mechanism. At this point, there was  motion at the PIP joint that was improved compared to this  preoperatively. Approximately 80 to 90 degrees of flexion could be  achieved. We then copiously irrigated the wound. Skin flaps appeared to be  viable. We contoured our skin flaps appropriately to allow for  tension free closure over the amputated piece with excellent amount of  soft tissue coverage. A 4-0 nylon was used for closure. At this  point, we then placed a sterile over-wrap and sterile dressings and  AlumaFoam splint over the residual finger. The patient was awoken and  taken to PACU in a stable condition. POSTOPERATIVE PLAN:  We will keep the dressing intact. Plan for  followup in one week for wound check. Begin desensitization and  continue range of motion at the PIP joint.         Barney Jenkins MD    D: 10/13/2018 8:51:14       T: 10/13/2018 10:56:11     [email or web address]  Job#: [id number]     Doc#: [id number]    CC:

## 2018-10-17 LAB
ANAEROBIC CULTURE: NORMAL
GRAM STAIN RESULT: NORMAL
WOUND/ABSCESS: NORMAL

## 2018-10-18 ENCOUNTER — OFFICE VISIT (OUTPATIENT)
Dept: ORTHOPEDIC SURGERY | Age: 58
End: 2018-10-18

## 2018-10-18 ENCOUNTER — HOSPITAL ENCOUNTER (OUTPATIENT)
Dept: OCCUPATIONAL THERAPY | Age: 58
Setting detail: THERAPIES SERIES
Discharge: HOME | End: 2018-10-18
Payer: COMMERCIAL

## 2018-10-18 DIAGNOSIS — S62.625B OPEN DISPLACED FRACTURE OF MIDDLE PHALANX OF LEFT RING FINGER, INITIAL ENCOUNTER: Primary | ICD-10-CM

## 2018-10-18 PROCEDURE — 99024 POSTOP FOLLOW-UP VISIT: CPT | Performed by: ORTHOPAEDIC SURGERY

## 2018-10-18 PROCEDURE — L3933 FO W/O JOINTS CF: HCPCS | Performed by: OCCUPATIONAL THERAPIST

## 2018-10-26 RX ORDER — CLINDAMYCIN HYDROCHLORIDE 300 MG/1
300 CAPSULE ORAL 3 TIMES DAILY
Qty: 21 CAPSULE | Refills: 0 | Status: SHIPPED | OUTPATIENT
Start: 2018-10-26 | End: 2018-11-02

## 2018-10-26 NOTE — TELEPHONE ENCOUNTER
Spoke with patient via telephone. Patient has noticed some redness over last 3 days at tip of amputation site with some clear drainage. Concern for infection by patient. He isscheduled to followup in clinic next week and we will plan to keep appointment. Patient prescribed oral Clindamycin in interim and encouraged to call with any questions or worsening symptoms.

## 2018-10-31 ENCOUNTER — OFFICE VISIT (OUTPATIENT)
Dept: ORTHOPEDIC SURGERY | Age: 58
End: 2018-10-31

## 2018-10-31 DIAGNOSIS — S62.625B OPEN DISPLACED FRACTURE OF MIDDLE PHALANX OF LEFT RING FINGER, INITIAL ENCOUNTER: Primary | ICD-10-CM

## 2018-10-31 DIAGNOSIS — S66.325A LACERATION OF EXTENSOR MUSCLE, FASCIA AND TENDON OF LEFT RING FINGER AT WRIST AND HAND LEVEL, INITIAL ENCOUNTER: ICD-10-CM

## 2018-10-31 DIAGNOSIS — S61.215A LACERATION OF LEFT RING FINGER WITHOUT FOREIGN BODY WITHOUT DAMAGE TO NAIL, INITIAL ENCOUNTER: ICD-10-CM

## 2018-10-31 DIAGNOSIS — S67.195A CRUSHING INJURY OF LEFT RING FINGER, INITIAL ENCOUNTER: ICD-10-CM

## 2018-10-31 PROCEDURE — 99024 POSTOP FOLLOW-UP VISIT: CPT | Performed by: ORTHOPAEDIC SURGERY

## 2018-10-31 NOTE — PROGRESS NOTES
Chief Complaint:  Follow-up (LEFT RING FINGER)      History of Present of Illness:  Mr. Williams Santos is a 82-year-old male presenting as a 1st postoperative visit after his recent left ring finger surgery  Procedure: Revision amputation of left ring finger nonunion and painful finger  Date of surgery: 10/12/2018  He is now 3 weeks status post most recent surgery. He did call my office about 5 days ago with some symptoms of increased redness at his dictation site. He was placed on oral antibiotics at that time and since that time is noticed improvement in the redness swelling and pain. He does continue to have sensitivity along his finger with radiating symptoms in his finger. No additional complaints today      Examination:  General: Pleasant, well-appearing, no acute distress  Left ring finger/hand:  Amputation site is clean with sutures in place, there is minimal swelling, no obvious erythema, no fluctuance or drainage throughout the fingertip or remainder of finger and palm  Sutures are in place  No obvious malrotation or angulation  There is sensitivity along the volar ulnar aspect of the finger near PIP crease with positive Tinel's, patient otherwise demonstrates decreased sensation of fingertip but is comfortable with his finger motion with flexion and extension and excellent    Radiology:     X-rays obtained and reviewed in office:  No new images obtained today    No orders of the defined types were placed in this encounter. Impression:  Encounter Diagnoses   Name Primary?  Open displaced fracture of middle phalanx of left ring finger, initial encounter Yes    Laceration of left ring finger without foreign body without damage to nail, initial encounter     Crushing injury of left ring finger, initial encounter     Laceration of extensor muscle, fascia and tendon of left ring finger at wrist and hand level, initial encounter          Treatment Plan:   We will plan to remove sutures today, okay to begin

## 2018-11-07 ENCOUNTER — HOSPITAL ENCOUNTER (OUTPATIENT)
Dept: OCCUPATIONAL THERAPY | Age: 58
Setting detail: THERAPIES SERIES
Discharge: HOME | End: 2018-11-07
Payer: COMMERCIAL

## 2018-11-07 PROCEDURE — G8985 CARRY GOAL STATUS: HCPCS | Performed by: OCCUPATIONAL THERAPIST

## 2018-11-07 PROCEDURE — 97110 THERAPEUTIC EXERCISES: CPT | Performed by: OCCUPATIONAL THERAPIST

## 2018-11-07 PROCEDURE — G8984 CARRY CURRENT STATUS: HCPCS | Performed by: OCCUPATIONAL THERAPIST

## 2018-11-07 PROCEDURE — 97165 OT EVAL LOW COMPLEX 30 MIN: CPT | Performed by: OCCUPATIONAL THERAPIST

## 2018-11-07 PROCEDURE — 97140 MANUAL THERAPY 1/> REGIONS: CPT | Performed by: OCCUPATIONAL THERAPIST

## 2018-11-07 PROCEDURE — 97112 NEUROMUSCULAR REEDUCATION: CPT | Performed by: OCCUPATIONAL THERAPIST

## 2018-11-07 NOTE — FLOWSHEET NOTE
neuroma since amputation.     RESTRICTIONS/PRECAUTIONS:     OBJECTIVE:      Date: 11/7/18       Objective Measures:        RF   MP  PIP   DIP L  /90  /45  amputated       SF   MP  PIP  DIP L  90  60  0       Edema  Ring finger  P1  P2 L    6  6.3           Strength deferred       Sensation Hypersensitvity    coldness and tingling progresses as the day goes on volar pad of amputated ring finger             Modalities: 11/7/18         HP with flex taping 10'                                       Therapeutic Exercise & Activities:        A/AAROM Ring and small fingers    AROM with mari strap on ring finger  15x             gripping Blue Foam  20X    Squeezing towel  15X       Manual Flex loop fabricated for ring and small fingers  8'    Scar massage and debridement  Except for small open area on ulnar tip      Gel sleeve issued to be used at night and while working to protect site of neuroma         desensitization Tapping  5'  rubbing with towel  3'                                                   Therapeutic Exercise and NMR:  [x] (74012) Provided verbal/tactile cueing for activities related to strengthening, flexibility, endurance, ROM  for improvements in scapular, scapulothoracic and UE control with self care, reaching, carrying, lifting, house/yardwork, driving/computer work. [x] (93668) Provided verbal/tactile cueing for activities related to improving balance, coordination, kinesthetic sense, posture, motor skill, proprioception  to assist with  scapular, scapulothoracic and UE control with self care, reaching, carrying, lifting, house/yardwork, driving/computer work.   [] Comments:    Therapeutic Activities:    [] (31409 or 08467) Provided verbal/tactile cueing for activities related to improving balance, coordination, kinesthetic sense, posture, motor skill, proprioception and motor activation to allow for proper function of scapular, scapulothoracic and UE control with self care, carrying, lifting,

## 2018-11-07 NOTE — PLAN OF CARE
11 % or less on the [employer] D[employer] disability questionnaire for increased performance with carrying, moving, and handling objects. 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 4) Pt will demonstrate increased strength to L  is within 15# of R for improved performance with gripping at home and work. 5) Pt will have a decrease in hypersensitivity to minimal to facilitate improvement in performance with gripping at home and work. OCCUPATIONAL THERAPY EVALUATION COMPLEXITY JUSTIFICATION:    [] An occupational profile and medical/therapy history, which includes:   [x] a brief history including medical and/or therapy records relating to the     presenting problem   [] an expanded review of medical and/or therapy records and additional review     of physical, cognitive or psychosocial history related to current functional    performance   [] an extensive additional review of review of medical and/or therapy records   and physical, cognitive, or psychosocial history related to current    functional performance    [] An assessment that identifies performance deficits (relating to physical, cognitive, or psychosocial skills) that result in activity limitations and/or participation restrictions:   [x] 1-3 performance deficits   [] 3-5 performance deficits   [] 5 or more performance deficits    [] Clinical decision making of:   [x] low complexity, including analysis of occupational profile, data analysis from problem focused assessment, and consideration of a limited number of treatment options. No comorbidities affect occupational performance. No task modifications or assistance needed to complete evaluation. [] moderate complexity, including analysis of occupational profile, data analysis from detailed assessment and consideration of several treatment options.

## 2018-11-12 LAB
FUNGUS (MYCOLOGY) CULTURE: NORMAL
FUNGUS STAIN: NORMAL

## 2018-11-14 ENCOUNTER — HOSPITAL ENCOUNTER (OUTPATIENT)
Dept: OCCUPATIONAL THERAPY | Age: 58
Setting detail: THERAPIES SERIES
Discharge: HOME | End: 2018-11-14
Payer: COMMERCIAL

## 2018-11-14 NOTE — FLOWSHEET NOTE
[facility]      Occupational Therapy  Cancellation/No-show Note  Patient Name:  Niraj Boland   :  1960   Date:  2018  Cancelled visits to date: 2  Cancelled for &18  No-shows to date: 0    For today's appointment patient:  [x]    Cancelled  []    Rescheduled appointment  []    No-show     Reason given by patient:  []    Patient ill  []    Conflicting appointment  []    No transportation    []    Conflict with work  [x]    No reason given  []    Other:     Comments:      Electronically signed by:   Patricia Bernstein OTR/L [address]

## 2018-11-16 ENCOUNTER — APPOINTMENT (OUTPATIENT)
Dept: OCCUPATIONAL THERAPY | Age: 58
End: 2018-11-16
Payer: COMMERCIAL

## 2018-11-19 ENCOUNTER — APPOINTMENT (OUTPATIENT)
Dept: OCCUPATIONAL THERAPY | Age: 58
End: 2018-11-19
Payer: COMMERCIAL

## 2018-11-21 ENCOUNTER — APPOINTMENT (OUTPATIENT)
Dept: OCCUPATIONAL THERAPY | Age: 58
End: 2018-11-21
Payer: COMMERCIAL

## 2018-11-27 LAB
AFB CULTURE (MYCOBACTERIA): NORMAL
AFB SMEAR: NORMAL

## 2018-11-29 ENCOUNTER — HOSPITAL ENCOUNTER (OUTPATIENT)
Dept: OCCUPATIONAL THERAPY | Age: 58
Setting detail: THERAPIES SERIES
Discharge: HOME | End: 2018-11-29
Payer: COMMERCIAL

## 2018-11-29 NOTE — FLOWSHEET NOTE
[facility]      Occupational Therapy  Cancellation/No-show Note  Patient Name:  Carlos Neal   :  1960   Date:  2018  Cancelled visits to date: 2  Cancelled for &18  No-shows to date:2    For today's appointment patient:  []    Cancelled  []    Rescheduled appointment  [x]    No-show     Reason given by patient:  []    Patient ill  []    Conflicting appointment  []    No transportation    []    Conflict with work  [x]    No reason given  []    Other:     Comments:      Electronically signed by:   [address], OTR/L [address]

## 2019-01-09 ENCOUNTER — OFFICE VISIT (OUTPATIENT)
Dept: ORTHOPEDIC SURGERY | Age: 59
End: 2019-01-09

## 2019-01-09 DIAGNOSIS — S62.625B OPEN DISPLACED FRACTURE OF MIDDLE PHALANX OF LEFT RING FINGER, INITIAL ENCOUNTER: ICD-10-CM

## 2019-01-09 DIAGNOSIS — S67.195A CRUSHING INJURY OF LEFT RING FINGER, INITIAL ENCOUNTER: ICD-10-CM

## 2019-01-09 DIAGNOSIS — S61.215A LACERATION OF LEFT RING FINGER WITHOUT FOREIGN BODY WITHOUT DAMAGE TO NAIL, INITIAL ENCOUNTER: ICD-10-CM

## 2019-01-09 DIAGNOSIS — S66.325A LACERATION OF EXTENSOR MUSCLE, FASCIA AND TENDON OF LEFT RING FINGER AT WRIST AND HAND LEVEL, INITIAL ENCOUNTER: Primary | ICD-10-CM

## 2019-01-09 PROCEDURE — 99024 POSTOP FOLLOW-UP VISIT: CPT | Performed by: ORTHOPAEDIC SURGERY

## (undated) DEVICE — PADDING CAST W4INXL4YD HIGHLY ABSRB THAN COT EZ APPL

## (undated) DEVICE — DRAPE SURGICAL HAND PROX AURORA

## (undated) DEVICE — TUBING IRRIG L77IN DIA0.241IN L BOR FOR CYSTO W/ NVENT

## (undated) DEVICE — DRAPE 70X60IN SPLIT IMPERV ADHES STRIP

## (undated) DEVICE — COVER,MAYO STAND,XL,STERILE: Brand: MEDLINE

## (undated) DEVICE — ELECTRODE PT RET AD L9FT HI MOIST COND ADH HYDRGEL CORDED

## (undated) DEVICE — GOWN,SIRUS,POLYRNF,SETINSLV,L,20/CS: Brand: MEDLINE

## (undated) DEVICE — GOWN,PREVENTION PLUS,XLN/XL,ST,24/CS: Brand: MEDLINE

## (undated) DEVICE — UNDERGLOVE SURG SZ 8 BLU LTX FREE SYN POLYISOPRENE POLYMER

## (undated) DEVICE — CANNULA NSL AD TBNG L7FT PVC STR NONFLARED PRNG O2 DEL W STD

## (undated) DEVICE — CURITY STRETCH BANDAGE: Brand: CURITY

## (undated) DEVICE — CORD,CAUTERY,BIPOLAR,STERILE: Brand: MEDLINE

## (undated) DEVICE — BANDAGE COMPR W1INXL5YD BGE E ADH TENSOPLAST

## (undated) DEVICE — SURGICAL SET UP - SURE SET: Brand: MEDLINE INDUSTRIES, INC.

## (undated) DEVICE — COVER LT HNDL BLU PLAS

## (undated) DEVICE — GLOVE SURG SZ 75 L12IN FNGR THK13MIL BRN LTX SYN POLYMER W

## (undated) DEVICE — SOLUTION IV 1000ML 0.9% SOD CHL

## (undated) DEVICE — DRAPE EQUIP FOOTSWITCH 24X20 IN PUL CORDAND CRD LCK NS

## (undated) DEVICE — 1010 S-DRAPE TOWEL DRAPE 10/BX: Brand: STERI-DRAPE™

## (undated) DEVICE — INTENDED FOR TISSUE SEPARATION, AND OTHER PROCEDURES THAT REQUIRE A SHARP SURGICAL BLADE TO PUNCTURE OR CUT.: Brand: BARD-PARKER ® CARBON RIB-BACK BLADES

## (undated) DEVICE — GARMENT,MEDLINE,DVT,INT,CALF,MED, GEN2: Brand: MEDLINE

## (undated) DEVICE — Z INACTIVE NO SUPPLIER SOLUTIONIRRIG 3000ML 0.9% SOD CHL FLX CONT [79720808] [HOSPIRA WORLDWIDE INC]

## (undated) DEVICE — GLOVE SURG SZ 8 L12IN FNGR THK79MIL GRN LTX FREE

## (undated) DEVICE — SWAB CULTURET AMIES DBL

## (undated) DEVICE — PADDING CAST W4INXL4YD SPUN DACRON POLY POR SYN VERSATILE

## (undated) DEVICE — COVER,TABLE,HEAVY DUTY,77"X90",STRL: Brand: MEDLINE

## (undated) DEVICE — TURNOVER KIT RM INF CTRL TECH

## (undated) DEVICE — PODIATRY PK

## (undated) DEVICE — SYRINGE IRRIG 60ML SFT PLIABLE BLB EZ TO GRP 1 HND USE W/

## (undated) DEVICE — HOLDER SCALP PLAS G STD

## (undated) DEVICE — DRAPE C ARM W54XL84IN MINI FOR OEC 6800

## (undated) DEVICE — GOWN,SIRUS,POLYRNF,SETINSLV,XL,20/CS: Brand: MEDLINE